# Patient Record
Sex: FEMALE
[De-identification: names, ages, dates, MRNs, and addresses within clinical notes are randomized per-mention and may not be internally consistent; named-entity substitution may affect disease eponyms.]

---

## 2022-01-18 ENCOUNTER — HOSPITAL ENCOUNTER (EMERGENCY)
Dept: HOSPITAL 97 - ER | Age: 52
Discharge: HOME | End: 2022-01-18
Payer: SELF-PAY

## 2022-01-18 VITALS — OXYGEN SATURATION: 100 % | TEMPERATURE: 98.6 F

## 2022-01-18 VITALS — DIASTOLIC BLOOD PRESSURE: 74 MMHG | SYSTOLIC BLOOD PRESSURE: 126 MMHG

## 2022-01-18 DIAGNOSIS — K80.20: Primary | ICD-10-CM

## 2022-01-18 DIAGNOSIS — I10: ICD-10-CM

## 2022-01-18 LAB
ALBUMIN SERPL BCP-MCNC: 3.4 G/DL (ref 3.4–5)
ALP SERPL-CCNC: 119 U/L (ref 45–117)
ALT SERPL W P-5'-P-CCNC: 47 U/L (ref 12–78)
AST SERPL W P-5'-P-CCNC: 22 U/L (ref 15–37)
BUN BLD-MCNC: 15 MG/DL (ref 7–18)
GLUCOSE SERPLBLD-MCNC: 133 MG/DL (ref 74–106)
HCT VFR BLD CALC: 40.1 % (ref 36–45)
LIPASE SERPL-CCNC: 203 U/L (ref 73–393)
LYMPHOCYTES # SPEC AUTO: 1.6 K/UL (ref 0.7–4.9)
PMV BLD: 8.9 FL (ref 7.6–11.3)
POTASSIUM SERPL-SCNC: 3.7 MMOL/L (ref 3.5–5.1)
RBC # BLD: 4.45 M/UL (ref 3.86–4.86)
SP GR UR: >1.03 (ref 1–1.03)

## 2022-01-18 PROCEDURE — 81003 URINALYSIS AUTO W/O SCOPE: CPT

## 2022-01-18 PROCEDURE — 76705 ECHO EXAM OF ABDOMEN: CPT

## 2022-01-18 PROCEDURE — 80076 HEPATIC FUNCTION PANEL: CPT

## 2022-01-18 PROCEDURE — 81025 URINE PREGNANCY TEST: CPT

## 2022-01-18 PROCEDURE — 80048 BASIC METABOLIC PNL TOTAL CA: CPT

## 2022-01-18 PROCEDURE — 85025 COMPLETE CBC W/AUTO DIFF WBC: CPT

## 2022-01-18 PROCEDURE — 96375 TX/PRO/DX INJ NEW DRUG ADDON: CPT

## 2022-01-18 PROCEDURE — 83690 ASSAY OF LIPASE: CPT

## 2022-01-18 PROCEDURE — 96374 THER/PROPH/DIAG INJ IV PUSH: CPT

## 2022-01-18 PROCEDURE — 36415 COLL VENOUS BLD VENIPUNCTURE: CPT

## 2022-01-18 PROCEDURE — 99284 EMERGENCY DEPT VISIT MOD MDM: CPT

## 2022-01-18 NOTE — EDPHYS
Physician Documentation                                                                           

 [facility]                                                                 

Name: Elizabeth Neves                                                                     

Age: 51 yrs                                                                                       

Sex: Female                                                                                       

: 1970                                                                                   

MRN: [id number]                                                                                   

Arrival Date: 2022                                                                          

Time: 01:34                                                                                       

Account#: [id number]                                                                            

Bed 26                                                                                            

Private MD:                                                                                       

ED Physician George Freeman                                                                             

HPI:                                                                                              

                                                                                             

02:13 This 51 yrs old  Female presents to ER via Ambulatory with complaints of Flank  pkl 

      Pain.                                                                                       

02:13 The patient presents with abdominal pain in the right upper quadrant. Onset: The        pkl 

      symptoms/episode began/occurred today. The symptoms radiate to right back. Associated       

      signs and symptoms: none.                                                                   

                                                                                                  

Historical:                                                                                       

- Allergies:                                                                                      

:56 No Known Allergies;                                                                     sf1 

- Home Meds:                                                                                      

:56 Unable to obtain [Active];                                                              sf1 

- PMHx:                                                                                           

01:56 Hypertensive disorder;                                                                  sf1 

- PSHx:                                                                                           

01:56 Abdominal Surgery;                                                                      sf1 

                                                                                                  

- Immunization history:: Adult Immunizations not up to date.                                      

- Social history:: Smoking status: Patient denies any tobacco usage or history of.                

  Patient/guardian denies using alcohol, street drugs.                                            

                                                                                                  

                                                                                                  

ROS:                                                                                              

02:13 Eyes: Negative for injury, pain, redness, and discharge, ENT: Negative for injury,      pkl 

      pain, and discharge, Neck: Negative for injury, pain, and swelling, Cardiovascular:         

      Negative for chest pain, palpitations, and edema, Respiratory: Negative for shortness       

      of breath, cough, wheezing, and pleuritic chest pain.                                       

02:13 Abdomen/GI: Positive for abdominal pain, of the right upper quadrant.                       

02:13 Back: Negative for acute changes.                                                           

02:13 : Negative for urinary symptoms.                                                          

02:13 MS/extremity: Negative for acute changes.                                                   

02:13 Skin: Negative for rash.                                                                    

02:13 Neuro: Negative for altered mental status, loss of consciousness.                           

                                                                                                  

Exam:                                                                                             

02:13 Head/Face:  Normocephalic, atraumatic. Eyes:  Pupils equal round and reactive to light, pkl 

      extra-ocular motions intact.  Lids and lashes normal.  Conjunctiva and sclera are           

      non-icteric and not injected.  Cornea within normal limits.  Periorbital areas with no      

      swelling, redness, or edema. ENT:  Nares patent. No nasal discharge, no septal              

      abnormalities noted.  Tympanic membranes are normal and external auditory canals are        

      clear.  Oropharynx with no redness, swelling, or masses, exudates, or evidence of           

      obstruction, uvula midline.  Mucous membranes moist. Neck:  Trachea midline, no             

      thyromegaly or masses palpated, and no cervical lymphadenopathy.  Supple, full range of     

      motion without nuchal rigidity, or vertebral point tenderness.  No Meningismus.             

      Chest/axilla:  Normal chest wall appearance and motion.  Nontender with no deformity.       

      No lesions are appreciated. Cardiovascular:  Regular rate and rhythm with a normal S1       

      and S2.  No gallops, murmurs, or rubs.  Normal PMI, no JVD.  No pulse deficits.             

      Respiratory:  Lungs have equal breath sounds bilaterally, clear to auscultation and         

      percussion.  No rales, rhonchi or wheezes noted.  No increased work of breathing, no        

      retractions or nasal flaring.                                                               

02:13 Abdomen/GI: Bowel sounds: normal, Palpation: soft, mild abdominal tenderness, in the        

      right upper quadrant.                                                                       

02:13 Back: Exam negative for acute changes.                                                      

02:13 : Exam negative for acute changes.                                                        

02:13 Musculoskeletal/extremity: Exam is negative for acute changes.                              

02:13 Skin: Exam negative for rash.                                                               

02:13 Neuro: Orientation: is normal, Mentation: is normal, Cranial nerves: grossly normal,        

      Motor: is normal.                                                                           

                                                                                                  

Vital Signs:                                                                                      

01:54  / 87; Pulse 81; Resp 18; Temp 98.6; Pulse Ox 100% ; Weight 81.65 kg; Height 5    sf1 

      ft. 1 in. (154.94 cm); Pain 6/10;                                                           

02:55  / 74; Pulse 68; Resp 20; Pain 5/10;                                              sf1 

01:54 Body Mass Index 34.01 (81.65 kg, 154.94 cm)                                             sf1 

                                                                                                  

MDM:                                                                                              

01:45 Patient medically screened.                                                             pkl 

03:39 Data reviewed: vital signs, nurses notes, lab test result(s), radiologic studies,       pkl 

      ultrasound. ED course: Patient feeling better. Discussed lab and imaging studies with       

      patient. Advised to follow up with Surgeon in 2 to 3 days. To return if necessary.          

      Patient understood instructions.                                                            

                                                                                                  

                                                                                             

02:12 Order name: Basic Metabolic Panel; Complete Time: 03:18                                 pkl 

                                                                                             

02:12 Order name: CBC with Diff; Complete Time: 03:18                                         pkl 

                                                                                             

02:12 Order name: Hepatic Function; Complete Time: 03:18                                      pkl 

                                                                                             

02:12 Order name: Lipase; Complete Time: 03:18                                                pkl 

                                                                                             

02:28 Order name: Urine Dipstick-Ancillary; Complete Time: 03:18                              EDMS

                                                                                             

02:30 Order name: Urine Pregnancy--Ancillary (enter results); Complete Time: 03:18            lp1 

                                                                                             

02:12 Order name: IV Saline Lock; Complete Time: 02:18                                        pkl 

                                                                                             

02:12 Order name: Labs collected and sent; Complete Time: 02:38                               pkl 

                                                                                             

02:12 Order name: US Abdomen Limited                                                          pkl 

                                                                                             

02:16 Order name: Urine Dipstick-Ancillary (obtain specimen); Complete Time: 02:37            pkl 

                                                                                                  

Administered Medications:                                                                         

02:37 Drug: NS 0.9% 1000 ml Route: IV; Rate: 1000 ml; Site: right antecubital;                sf1 

02:37 Drug: morphine 4 mg Route: IVP; Site: right antecubital;                                sf1 

02:37 Drug: Zofran (Ondansetron) 4 mg Route: IVP; Site: right antecubital;                    sf1 

03:46 Drug: morphine 4 mg Route: IVP; Site: right antecubital;                                sf1 

                                                                                                  

                                                                                                  

Disposition Summary:                                                                              

22 03:42                                                                                    

Discharge Ordered                                                                                 

      Location: Home                                                                          pkl 

      Problem: new                                                                            pkl 

      Symptoms: have improved                                                                 pkl 

      Condition: Stable                                                                       pkl 

      Diagnosis                                                                                   

        - Cholelithiasis                                                                      pkl 

      Followup:                                                                               pkl 

        - With: Private Physician                                                                  

        - When: 2 - 3 days                                                                         

        - Reason: Re-evaluation by your physician                                                  

      Discharge Instructions:                                                                     

        - Discharge Summary Sheet                                                             pkl 

      Forms:                                                                                      

        - Medication Reconciliation Form                                                      pkl 

        - Thank You Letter                                                                    pkl 

        - Antibiotic Education                                                                pkl 

        - Prescription Opioid Use                                                             pkl 

        - Work release form                                                                   sf1 

      Prescriptions:                                                                              

        - Ultram 50 mg Oral Tablet                                                                 

            - take 1 tablet by ORAL route every 8 hours As needed; 12 tablet; Refills: 0,     pkl 

      Product Selection Permitted                                                                 

        - Cipro 500 mg Oral Tablet                                                                 

            - take 1 tablet by ORAL route every 12 hours for 7 days; 14 tablet; Refills: 0,   pkl 

      Product Selection Permitted                                                                 

Signatures:                                                                                       

Dispatcher [employer]                           George Quiñonez MD MD   pkl                                                  

Sandra Sapp RN                   RN   sf1                                                  

                                                                                                  

**************************************************************************************************

## 2022-01-18 NOTE — RAD REPORT
EXAM DESCRIPTION:  US - Abdomen Exam Limited - 1/18/2022 2:38 am

 

CLINICAL HISTORY:  ABD PAIN

 

COMPARISON:  No comparisons

 

FINDINGS:  The gallbladder demonstrates shadowing gallstones No pericholecystic fluid or gallbladder 
wall thickening. The common bile duct is normal measuring 3 mm.

 

The liver demonstrates no findings of intrahepatic biliary dilatation.

 

IMPRESSION:  Cholelithiasis without sonographic evidence of acute cholecystitis.

## 2022-01-18 NOTE — ER
Nurse's Notes                                                                                     

 [facility]                                                                 

Name: Elizabeth Neves                                                                     

Age: 51 yrs                                                                                       

Sex: Female                                                                                       

: 1970                                                                                   

MRN: [id number]                                                                                   

Arrival Date: 2022                                                                          

Time: 01:34                                                                                       

Account#: [id number]                                                                            

Bed 26                                                                                            

Private MD:                                                                                       

Diagnosis: Cholelithiasis                                                                         

                                                                                                  

Presentation:                                                                                     

                                                                                             

01:54 Chief complaint: Patient states: pain in the right upper quadrant. Coronavirus screen:  sf1 

      Vaccine status: Patient reports being unvaccinated. Client denies travel out of the         

      [place] in the last 14 days. Ebola Screen: Patient negative for fever greater than or          

      equal to 101.5 degrees Fahrenheit, and additional compatible Ebola Virus Disease            

      symptoms Patient denies exposure to infectious person. Patient denies travel to an          

      Ebola-affected area in the 21 days before illness onset. Initial Sepsis Screen: Does        

      the patient meet any 2 criteria? No. Patient's initial sepsis screen is negative. Does      

      the patient have a suspected source of infection? No. Patient's initial sepsis screen       

      is negative. Risk Assessment: Do you want to hurt yourself or someone else? Patient         

      reports no desire to harm self or others. Onset of symptoms is unknown.                     

01:54 Method Of Arrival: Ambulatory                                                           sf1 

01:54 Acuity: RUSSEL 3                                                                           sf1 

                                                                                                  

Triage Assessment:                                                                                

01:56 General: Appears in no apparent distress. uncomfortable, Behavior is calm, cooperative, sf1 

      appropriate for age. Pain: Complains of pain in right upper quadrant Pain currently is      

      6 out of 10 on a pain scale. Neuro: No deficits noted. Cardiovascular: No deficits          

      noted. Respiratory: No deficits noted. GI: Reports upper abdominal pain.                    

                                                                                                  

Historical:                                                                                       

- Allergies:                                                                                      

01:56 No Known Allergies;                                                                     sf1 

- Home Meds:                                                                                      

01:56 Unable to obtain [Active];                                                              sf1 

- PMHx:                                                                                           

01:56 Hypertensive disorder;                                                                  sf1 

- PSHx:                                                                                           

01:56 Abdominal Surgery;                                                                      sf1 

                                                                                                  

- Immunization history:: Adult Immunizations not up to date.                                      

- Social history:: Smoking status: Patient denies any tobacco usage or history of.                

  Patient/guardian denies using alcohol, street drugs.                                            

                                                                                                  

                                                                                                  

Screenin:20 Abuse screen: Denies threats or abuse. Nutritional screening: No deficits noted.        sf1 

      Tuberculosis screening: No symptoms or risk factors identified. Fall Risk None              

      identified.                                                                                 

                                                                                                  

Vital Signs:                                                                                      

01:54  / 87; Pulse 81; Resp 18; Temp 98.6; Pulse Ox 100% ; Weight 81.65 kg; Height 5    sf1 

      ft. 1 in. (154.94 cm); Pain 6/10;                                                           

02:55  / 74; Pulse 68; Resp 20; Pain 5/10;                                              sf1 

01:54 Body Mass Index 34.01 (81.65 kg, 154.94 cm)                                             sf1 

                                                                                                  

ED Course:                                                                                        

01:34 Patient arrived in ED.                                                                  es  

01:45 George Freeman MD is Attending Physician.                                                    pkl 

01:54 Sandra Sapp RN is Primary Nurse.                                                 sf1 

01:56 Triage completed.                                                                       sf1 

01:56 Arm band placed on left wrist.                                                          sf1 

02:38 US Abdomen Limited In Process Unspecified.                                              EDMS

02:38 Basic Metabolic Panel Sent.                                                             sf1 

02:38 CBC with Diff Sent.                                                                     sf1 

02:38 Hepatic Function Sent.                                                                  sf1 

02:38 Lipase Sent.                                                                            sf1 

03:53 Inserted saline lock: 20 gauge in right antecubital area, using aseptic technique.      sf1 

04:20 Patient has correct armband on for positive identification. Bed in low position. Call   sf1 

      light in reach.                                                                             

04:20 No provider procedures requiring assistance completed. IV discontinued, intact,         sf1 

      bleeding controlled, No redness/swelling at site. Pressure dressing applied.                

                                                                                                  

Administered Medications:                                                                         

02:37 Drug: NS 0.9% 1000 ml Route: IV; Rate: 1000 ml; Site: right antecubital;                sf1 

02:37 Drug: morphine 4 mg Route: IVP; Site: right antecubital;                                sf1 

02:37 Drug: Zofran (Ondansetron) 4 mg Route: IVP; Site: right antecubital;                    sf1 

03:46 Drug: morphine 4 mg Route: IVP; Site: right antecubital;                                sf1 

                                                                                                  

                                                                                                  

Outcome:                                                                                          

03:42 Discharge ordered by MD.                                                                pkl 

04:20 Discharged to home ambulatory.                                                          sf1 

04:20 Condition: good                                                                             

04:20 Discharge instructions given to patient, Instructed on discharge instructions, follow       

      up and referral plans. Demonstrated understanding of instructions, follow-up care,          

      medications, Prescriptions given X 2.                                                       

04:24 Patient left the ED.                                                                    sf1 

                                                                                                  

Signatures:                                                                                       

Dispatcher [employer]                           EDGeorge Alvarado MD MD   pkl                                                  

Rosebud, Soo                                 es                                                   

Fillers, Sandra, RN                   RN   sf1                                                  

                                                                                                  

**************************************************************************************************